# Patient Record
Sex: FEMALE | Race: WHITE | Employment: PART TIME | URBAN - METROPOLITAN AREA
[De-identification: names, ages, dates, MRNs, and addresses within clinical notes are randomized per-mention and may not be internally consistent; named-entity substitution may affect disease eponyms.]

---

## 2017-06-20 ENCOUNTER — OFFICE VISIT (OUTPATIENT)
Dept: HEMATOLOGY | Age: 57
End: 2017-06-20

## 2017-06-20 VITALS
BODY MASS INDEX: 19.45 KG/M2 | TEMPERATURE: 98.4 F | DIASTOLIC BLOOD PRESSURE: 75 MMHG | SYSTOLIC BLOOD PRESSURE: 140 MMHG | HEIGHT: 61 IN | OXYGEN SATURATION: 98 % | WEIGHT: 103 LBS | HEART RATE: 72 BPM

## 2017-06-20 DIAGNOSIS — R76.8 HCV ANTIBODY POSITIVE: Primary | ICD-10-CM

## 2017-06-20 PROBLEM — I10 HYPERTENSION: Status: ACTIVE | Noted: 2017-06-20

## 2017-06-20 PROBLEM — B18.2 CHRONIC HEPATITIS C WITHOUT HEPATIC COMA (HCC): Status: ACTIVE | Noted: 2017-06-20

## 2017-06-20 PROBLEM — Z90.49 S/P CHOLECYSTECTOMY: Status: ACTIVE | Noted: 2017-06-20

## 2017-06-20 RX ORDER — GLUCOSAM/CHONDRO/HERB 149/HYAL 750-100 MG
TABLET ORAL
COMMUNITY
End: 2017-11-13 | Stop reason: ALTCHOICE

## 2017-06-20 RX ORDER — BISMUTH SUBSALICYLATE 262 MG
1 TABLET,CHEWABLE ORAL DAILY
COMMUNITY
End: 2017-11-13 | Stop reason: ALTCHOICE

## 2017-06-20 RX ORDER — LISINOPRIL 10 MG/1
TABLET ORAL DAILY
COMMUNITY

## 2017-06-20 RX ORDER — OXYCODONE AND ACETAMINOPHEN 10; 325 MG/1; MG/1
TABLET ORAL
COMMUNITY
End: 2017-08-22

## 2017-06-20 NOTE — MR AVS SNAPSHOT
Visit Information Date & Time Provider Department Dept. Phone Encounter #  
 6/20/2017  9:30 AM Marcelo Crabtree MD [facility] [address] [place] [address] [id number] Upcoming Health Maintenance Date Due Hepatitis C Screening 1960 DTaP/Tdap/Td series (1 - Tdap) 5/1/1979 PAP AKA CERVICAL CYTOLOGY 5/1/1979 BREAST CANCER SCRN MAMMOGRAM 5/1/2008 FOBT Q 1 YEAR AGE 50-75 5/1/2008 INFLUENZA AGE 9 TO ADULT 8/1/2017 Allergies as of 6/20/2017  Review Complete On: 6/20/2017 By: Tulio Kinney No Known Allergies Current Immunizations  Never Reviewed No immunizations on file. Not reviewed this visit You Were Diagnosed With   
  
 Codes Comments HCV antibody positive    -  Primary ICD-10-CM: R76.8 ICD-9-CM: 795.79 Vitals BP Pulse Temp Height(growth percentile) 140/75 (BP 1 Location: Left arm, BP Patient Position: Sitting) 72 98.4 °F (36.9 °C) (Tympanic) 5' 1\" (1.549 m) Weight(growth percentile) SpO2 BMI Smoking Status 103 lb (46.7 kg) 98% 19.46 kg/m2 Former Smoker BMI and BSA Data Body Mass Index Body Surface Area  
 19.46 kg/m 2 1.42 m 2 Your Updated Medication List  
  
   
This list is accurate as of: 6/20/17  9:49 AM.  Always use your most recent med list.  
  
  
  
  
 IRON (DRIED) PO Take  by mouth.  
  
 lisinopril 10 mg tablet Commonly known as:  Rain Flight Take  by mouth daily. multivitamin tablet Commonly known as:  ONE A DAY Take 1 Tab by mouth daily. Omega-3-DHA-EPA-Fish Oil 1,000 mg (120 mg-180 mg) Cap Take  by mouth. PERCOCET  mg per tablet Generic drug:  oxyCODONE-acetaminophen Take  by mouth. We Performed the Following HCV RNA BY TINO QL,RFLX TO QT [89629 CPT(R)] HEPATIC FUNCTION PANEL [03576 CPT(R)] Introducing [address]!    
 [facility] introduces [employer] patient portal. Now you can access parts of your medical record, email your doctor's office, and request medication refills online. 1. In your internet browser, go to [email or web address] 2. Click on the First Time User? Click Here link in the Sign In box. You will see the New Member Sign Up page. 3. Enter your [employer] Access Code exactly as it appears below. You will not need to use this code after youve completed the sign-up process. If you do not sign up before the expiration date, you must request a new code. · [employer] Access Code: [email or web address] Expires: 9/18/2017  9:48 AM 
 
4. Enter the last four digits of your Social Security Number (xxxx) and Date of Birth (mm/dd/yyyy) as indicated and click Submit. You will be taken to the next sign-up page. 5. Create a [employer] ID. This will be your [employer] login ID and cannot be changed, so think of one that is secure and easy to remember. 6. Create a [employer] password. You can change your password at any time. 7. Enter your Password Reset Question and Answer. This can be used at a later time if you forget your password. 8. Enter your e-mail address. You will receive e-mail notification when new information is available in [address] 9. Click Sign Up. You can now view and download portions of your medical record. 10. Click the Download Summary menu link to download a portable copy of your medical information. If you have questions, please visit the Frequently Asked Questions section of the [employer] website. Remember, [employer] is NOT to be used for urgent needs. For medical emergencies, dial 911. Now available from your iPhone and Android! Please provide this summary of care documentation to your next provider. Your primary care clinician is listed as Cristine Jarvis. If you have any questions after today's visit, please call [phone].

## 2017-06-20 NOTE — PROGRESS NOTES
[address] Eleuterio Chao MD, BO Couch PA-C Alvira Mohair, NP        at [address], [address] [facility], [address]     [phone]     FAX: [phone]    at [address], [address] [place] [address]     [phone]     FAX: [phone]         Patient Care Team:  Husam Collins MD as PCP - General (Family Practice)      Problem List  Date Reviewed: 6/20/2017          Codes Class Noted    Chronic hepatitis C without hepatic coma [facility]) ICD-10-CM: B18.2  ICD-9-CM: 070.54  6/20/2017    Overview Signed 6/20/2017  9:34 AM by Jorden Ott MD     PEGINF, RBV 2006. SVR             Hypertension ICD-10-CM: I10  ICD-9-CM: 401.9  6/20/2017        S/P cholecystectomy ICD-10-CM: Z90.49  ICD-9-CM: V45.79  6/20/2017                The physicians listed above have asked me to see Jaimie Haynes in consultation regarding chronic HCV and its management. All medical records sent by the referring physicians were reviewed including imaging studies     The patient is a 59 y.o.  female who was noted to have abnormalities in liver chemistries and tested positive for chronic HCV in 2005. Risk factors for acquiring HCV are an ex- who used IV drugs. There was no history of acute incteric hepatitis at the time of these risk factors. Imaging of the liver was not recently performed. A liver biopsy was performed in 2005 at [facility]. The patient was treated with peginterferon and ribavirin in 0485-1029. The patient believes there was a sustained virologic response. The most recent laboratory studies are not available. The patient was recently retested by anti-HCV and was positive. The patient has no symptoms which can be attributed to the liver disorder.     The patient has not experienced fatigue, pain in the right side over the liver,     The patient completes all daily activities without any functional limitations. ALLERGIES  No Known Allergies    MEDICATIONS  Current Outpatient Prescriptions   Medication Sig    lisinopril (PRINIVIL, ZESTRIL) 10 mg tablet Take  by mouth daily.  oxyCODONE-acetaminophen (PERCOCET)  mg per tablet Take  by mouth.  multivitamin (ONE A DAY) tablet Take 1 Tab by mouth daily.  Omega-3-DHA-EPA-Fish Oil 1,000 mg (120 mg-180 mg) cap Take  by mouth.  FERROUS SULFATE, DRIED (IRON, DRIED, PO) Take  by mouth. No current facility-administered medications for this visit. SYSTEM REVIEW NOT RELATED TO LIVER DISEASE OR REVIEWED ABOVE:  Constitution systems: Negative for fever, chills, weight gain, weight loss. Eyes: Negative for visual changes. ENT: Negative for sore throat, painful swallowing. Respiratory: Negative for cough, hemoptysis, SOB. Cardiology: Negative for chest pain, palpitations. GI:  Negative for constipation or diarrhea. : Negative for urinary frequency, dysuria, hematuria, nocturia. Skin: Negative for rash. Hematology: Negative for easy bruising, blood clots. Musculo-skelatal: Negative for back pain, muscle pain, weakness. Neurologic: Frequent headaches. Psychology: Negative for anxiety, depression. FAMILY HISTORY:  The father  of MI. The mother  of lung cancer. There is no family history of liver disease. SOCIAL HISTORY:  The patient is . The patient has 1 child and 1 grandchildren. The patient stopped using tobacco products in . The patient consumes alcohol on rare social occasions never in excess. The patient currently works full time as a  at [employer].         PHYSICAL EXAMINATION:  Visit Vitals    /75 (BP 1 Location: Left arm, BP Patient Position: Sitting)    Pulse 72    Temp 98.4 °F (36.9 °C) (Tympanic)    Ht 5' 1\" (1.549 m)    Wt 103 lb (46.7 kg)    SpO2 98%    BMI 19.46 kg/m2     General: No acute distress. Eyes: Sclera anicteric. ENT: No oral lesions. Thyroid normal.  Nodes: No adenopathy. Skin: No spider angiomata. No jaundice. No palmar erythema. Respiratory: Lungs clear to auscultation. Cardiovascular: Regular heart rate. No murmurs. No JVD. Abdomen: Soft non-tender. Liver size normal to percussion/palpation. Spleen not palpable. No obvious ascites. Extremities: No edema. No muscle wasting. No gross arthritic changes. Neurologic: Alert and oriented. Cranial nerves grossly intact. No asterixis. LABORATORY STUDIES:  [facility] [place] of [address] [place] [address] & Units 6/20/2017   AST 0 - 40 IU/L 51 (H)   ALT 0 - 32 IU/L 64 (H)   Alk Phos 39 - 117 IU/L 77   Bili, Total 0.0 - 1.2 mg/dL 0.4   Bili, Direct 0.00 - 0.40 mg/dL 0.13   Albumin 3.5 - 5.5 g/dL 4.6     SEROLOGIES:  Serologies Latest Ref Rng & Units 6/20/2017   HCV RT-PCR, Quant IU/mL [id number]     LIVER HISTOLOGY:  Not available or performed    ENDOSCOPIC PROCEDURES:  Not available or performed    RADIOLOGY:  Not available or performed    OTHER TESTING:  Not available or performed    ASSESSMENT AND PLAN:  Chronic HCV of unclear severity. Have performed laboratory testing to monitor liver function and degree of liver injury. This included hepatic panel,     The liver transaminases are elevated. Liver function is normal.      Will perform and/or review results of HCV viral load. HCV RNA was positive. The patient was previously treated for HCV with peginterferon and ribavirin. She did not achieve a SVR and cure of HCV as she thought. She will need to return for additional laboratory testing to determine HCV genotype and Fibroscan. The need to perform an assessment of liver fibrosis was discussed with the patient. The Fibroscan can assess liver fibrosis and determine if a patient has advanced fibrosis or cirrhosis without the need for liver biopsy.   The Fibroscan is currently available at [facility] [place]. This will be performed a tthe next office visit. Discussed the treatment alternatives. The SVR/cure rate for HCV now exceeds 90% with just oral anti-viral therapy and no interferon injections or significant side effects for most patients with HCV. The specific treatment is dependent upon genotype, viral load and histology. The patient was directed to continue all current medications at the current dosages. There are no contraindications for the patient to take any medications that are necessary for treatment of other medical issues. The patient was counseled regarding alcohol consumption. The need for vaccination against viral hepatitis A and B will be assessed with serologic and instituted as appropriate. All of the above issues were discussed with the patient. All questions were answered. The patient expressed a clear understanding of the above. Follow-up [facility] [place] [facility] [place] for [address] and additioanl laboratory blood work needed to to initiate HCV treatment.     Marcelo Crabtree MD  [facility] [place] [address]  [phone]

## 2017-06-21 LAB
ALBUMIN SERPL-MCNC: 4.6 G/DL (ref 3.5–5.5)
ALP SERPL-CCNC: 77 IU/L (ref 39–117)
ALT SERPL-CCNC: 64 IU/L (ref 0–32)
AST SERPL-CCNC: 51 IU/L (ref 0–40)
BILIRUB DIRECT SERPL-MCNC: 0.13 MG/DL (ref 0–0.4)
BILIRUB SERPL-MCNC: 0.4 MG/DL (ref 0–1.2)
PROT SERPL-MCNC: 7 G/DL (ref 6–8.5)

## 2017-06-22 LAB
HCV RNA SERPL NAA+PROBE-ACNC: NORMAL IU/ML
HCV RNA SERPL NAA+PROBE-LOG IU: 6.39 LOG10 IU/ML
HCV RNA SERPL QL NAA+PROBE: POSITIVE
TEST INFORMATION: NORMAL

## 2017-07-13 ENCOUNTER — TELEPHONE (OUTPATIENT)
Dept: HEMATOLOGY | Age: 57
End: 2017-07-13

## 2017-07-18 ENCOUNTER — TELEPHONE (OUTPATIENT)
Dept: HEMATOLOGY | Age: 57
End: 2017-07-18

## 2017-07-18 NOTE — TELEPHONE ENCOUNTER
----- Message from Morelia Pedraza MD sent at 7/16/2017  3:52 PM EDT -----  Call and tell her she was not cured of HCV from the previous interferon treatment at [facility]. She still has the virus. Schedule her for follow-up with Storm Richards or April with Fibroscan.   LUL

## 2017-08-22 ENCOUNTER — OFFICE VISIT (OUTPATIENT)
Dept: HEMATOLOGY | Age: 57
End: 2017-08-22

## 2017-08-22 VITALS
WEIGHT: 97.8 LBS | OXYGEN SATURATION: 98 % | SYSTOLIC BLOOD PRESSURE: 125 MMHG | HEART RATE: 80 BPM | TEMPERATURE: 99 F | BODY MASS INDEX: 18.48 KG/M2 | DIASTOLIC BLOOD PRESSURE: 79 MMHG

## 2017-08-22 DIAGNOSIS — B18.2 CHRONIC HEPATITIS C WITHOUT HEPATIC COMA (HCC): Primary | ICD-10-CM

## 2017-08-22 RX ORDER — OXYCODONE HYDROCHLORIDE 10 MG/1
10 TABLET ORAL
COMMUNITY
Start: 2017-07-13 | End: 2017-11-13 | Stop reason: ALTCHOICE

## 2017-08-22 NOTE — MR AVS SNAPSHOT
Visit Information Date & Time Provider Department Dept. Phone Encounter #  
 8/22/2017  2:00 PM April Hattie Santacruz NP [facility] [address] [place] [address] [id number] Upcoming Health Maintenance Date Due Pneumococcal 19-64 Medium Risk (1 of 1 - PPSV23) 5/1/1977 DTaP/Tdap/Td series (1 - Tdap) 5/1/1979 FOBT Q 1 YEAR AGE 50-75 5/1/2008 BREAST CANCER SCRN MAMMOGRAM 3/15/2014 PAP AKA CERVICAL CYTOLOGY 2/16/2015 INFLUENZA AGE 9 TO ADULT 8/1/2017 Allergies as of 8/22/2017  Review Complete On: 8/22/2017 By: Jason Roca No Known Allergies Current Immunizations  Never Reviewed No immunizations on file. Not reviewed this visit You Were Diagnosed With   
  
 Codes Comments Chronic hepatitis C without hepatic coma (HCC)    -  Primary ICD-10-CM: B18.2 ICD-9-CM: 070.54 Vitals BP Pulse Temp Weight(growth percentile) SpO2 BMI  
 125/79 80 99 °F (37.2 °C) (Tympanic) 97 lb 12.8 oz (44.4 kg) 98% 18.48 kg/m2 Smoking Status Former Smoker Vitals History BMI and BSA Data Body Mass Index Body Surface Area  
 18.48 kg/m 2 1.38 m 2 Your Updated Medication List  
  
   
This list is accurate as of: 8/22/17  2:28 PM.  Always use your most recent med list.  
  
  
  
  
 IRON (DRIED) PO Take  by mouth.  
  
 lisinopril 10 mg tablet Commonly known as:  Katey Maza Take  by mouth daily. multivitamin tablet Commonly known as:  ONE A DAY Take 1 Tab by mouth daily. Omega-3-DHA-EPA-Fish Oil 1,000 mg (120 mg-180 mg) Cap Take  by mouth. oxyCODONE IR 10 mg Tab immediate release tablet Commonly known as:  Gisela Magyar Take 10 mg by mouth. We Performed the Following CBC W/O DIFF [90092 CPT(R)] HEP C GENOTYPE [44449 CPT(R)] LIVER ELASTOGRAPHY W/O IMAG W/I&R [68456 CPT(R)] METABOLIC PANEL, COMPREHENSIVE [60171 CPT(R)] Introducing [facility]! Felicitas More introduces [employer] patient portal. Now you can access parts of your medical record, email your doctor's office, and request medication refills online. 1. In your internet browser, go to [email or web address] 2. Click on the First Time User? Click Here link in the Sign In box. You will see the New Member Sign Up page. 3. Enter your [employer] Access Code exactly as it appears below. You will not need to use this code after youve completed the sign-up process. If you do not sign up before the expiration date, you must request a new code. · [employer] Access Code: [email or web address] Expires: 9/18/2017  9:48 AM 
 
4. Enter the last four digits of your Social Security Number (xxxx) and Date of Birth (mm/dd/yyyy) as indicated and click Submit. You will be taken to the next sign-up page. 5. Create a [employer]Rx ID. This will be your [employer] login ID and cannot be changed, so think of one that is secure and easy to remember. 6. Create a [employer] password. You can change your password at any time. 7. Enter your Password Reset Question and Answer. This can be used at a later time if you forget your password. 8. Enter your e-mail address. You will receive e-mail notification when new information is available in [address] 9. Click Sign Up. You can now view and download portions of your medical record. 10. Click the Download Summary menu link to download a portable copy of your medical information. If you have questions, please visit the Frequently Asked Questions section of the [employer] website. Remember, [employer] is NOT to be used for urgent needs. For medical emergencies, dial 911. Now available from your iPhone and Android! Please provide this summary of care documentation to your next provider. Your primary care clinician is listed as Henrik oLmas. If you have any questions after today's visit, please call [phone].

## 2017-08-23 LAB
ALBUMIN SERPL-MCNC: 4.5 G/DL (ref 3.5–5.5)
ALBUMIN/GLOB SERPL: 2 {RATIO} (ref 1.2–2.2)
ALP SERPL-CCNC: 67 IU/L (ref 39–117)
ALT SERPL-CCNC: 52 IU/L (ref 0–32)
AST SERPL-CCNC: 40 IU/L (ref 0–40)
BILIRUB SERPL-MCNC: 0.6 MG/DL (ref 0–1.2)
BUN SERPL-MCNC: 14 MG/DL (ref 6–24)
BUN/CREAT SERPL: 24 (ref 9–23)
CALCIUM SERPL-MCNC: 9.4 MG/DL (ref 8.7–10.2)
CHLORIDE SERPL-SCNC: 104 MMOL/L (ref 96–106)
CO2 SERPL-SCNC: 25 MMOL/L (ref 18–29)
CREAT SERPL-MCNC: 0.59 MG/DL (ref 0.57–1)
ERYTHROCYTE [DISTWIDTH] IN BLOOD BY AUTOMATED COUNT: 12.8 % (ref 12.3–15.4)
GLOBULIN SER CALC-MCNC: 2.3 G/DL (ref 1.5–4.5)
GLUCOSE SERPL-MCNC: 104 MG/DL (ref 65–99)
HCT VFR BLD AUTO: 38.4 % (ref 34–46.6)
HGB BLD-MCNC: 13.2 G/DL (ref 11.1–15.9)
MCH RBC QN AUTO: 32.3 PG (ref 26.6–33)
MCHC RBC AUTO-ENTMCNC: 34.4 G/DL (ref 31.5–35.7)
MCV RBC AUTO: 94 FL (ref 79–97)
PLATELET # BLD AUTO: 203 X10E3/UL (ref 150–379)
PLEASE NOTE:, 188601: NORMAL
POTASSIUM SERPL-SCNC: 4.1 MMOL/L (ref 3.5–5.2)
PROT SERPL-MCNC: 6.8 G/DL (ref 6–8.5)
RBC # BLD AUTO: 4.09 X10E6/UL (ref 3.77–5.28)
SODIUM SERPL-SCNC: 145 MMOL/L (ref 134–144)
WBC # BLD AUTO: 5.6 X10E3/UL (ref 3.4–10.8)

## 2017-08-23 NOTE — PROGRESS NOTES
[address] Chamorro MD, [address], [place], [place]       BO Power PA-C Vesta Loll, NP Charmaine Postin, NP        at [address], [address] [facility], [address]     [phone]     FAX: [phone]    at [address], [address] [place] [address], [address]     [phone]     FAX: [phone]     Patient Care Team:  Heaven Looney MD as PCP - [facility])  None    Patient Active Problem List   Diagnosis Code    Chronic hepatitis C without hepatic coma ([facility] [address] B18.2    Hypertension I10    S/P cholecystectomy Z90.49     Zamzam Akins returns to the [address] regarding chronic HCV and its management. The active problem list, all pertinent past medical history, medications, radiologic findings and laboratory findings related to the liver disorder were reviewed with the patient. The patient is a 59 y.o.  female who was noted to have abnormalities in liver chemistries and tested positive for chronic HCV in 2005. Risk factors for acquiring HCV are an ex- who used IV drugs. There was no history of acute incteric hepatitis at the time of these risk factors. Imaging of the liver was not recently performed. A liver biopsy was performed in 2005 at [facility]. Patient presents to the clinic today for a Fibroscan to assess liver fibrosis or scarring. The patient was treated with peginterferon and ribavirin in 8225-8481. The patient believes there was a sustained virologic response. The most recent laboratory studies are not available. The patient was recently retested by anti-HCV which was positive with a viral load of 2.46 IU/mL. A HCV genotype has not been performed. Patient returns to the clinic today to discuss current HCV treatment options.     The patient has no symptoms which can be attributed to the liver disorder. Today, patient denies abdominal pain, change in bowel habits, dark urine, myalgias, arthralgias, pruritus and problems concentrating. The patient completes all daily activities without any functional limitations. ALLERGIES  No Known Allergies    MEDICATIONS  Current Outpatient Prescriptions   Medication Sig    oxyCODONE IR (ROXICODONE) 10 mg tab immediate release tablet Take 10 mg by mouth.  lisinopril (PRINIVIL, ZESTRIL) 10 mg tablet Take  by mouth daily.  multivitamin (ONE A DAY) tablet Take 1 Tab by mouth daily.  Omega-3-DHA-EPA-Fish Oil 1,000 mg (120 mg-180 mg) cap Take  by mouth.  FERROUS SULFATE, DRIED (IRON, DRIED, PO) Take  by mouth. No current facility-administered medications for this visit. SYSTEM REVIEW NOT RELATED TO LIVER DISEASE OR REVIEWED ABOVE:  Constitution systems: Negative for fever, chills, weight gain, weight loss. Eyes: Negative for visual changes. ENT: Negative for sore throat, painful swallowing. Respiratory: Negative for cough, hemoptysis, SOB. Cardiology: Negative for chest pain, palpitations. GI:  Negative for constipation or diarrhea. : Negative for urinary frequency, dysuria, hematuria, nocturia. Skin: Negative for rash. Hematology: Negative for easy bruising, blood clots. Musculo-skelatal: Negative for back pain, muscle pain, weakness. Neurologic: Frequent headaches. Psychology: Negative for anxiety, depression. FAMILY HISTORY:  The father  of MI. The mother  of lung cancer. There is no family history of liver disease. SOCIAL HISTORY:  The patient is . The patient has 1 child and 1 grandchildren. The patient stopped using tobacco products in . The patient consumes alcohol on rare social occasions never in excess. The patient currently works full time as a  at [employer].       PHYSICAL EXAMINATION:  Visit Vitals    /79    Pulse 80    Temp 99 °F (37.2 °C) (Tympanic)    Wt 97 lb 12.8 oz (44.4 kg)    SpO2 98%    BMI 18.48 kg/m2     General: No acute distress. Eyes: Sclera anicteric. ENT: No oral lesions. Thyroid normal.  Nodes: No adenopathy. Skin: No spider angiomata. No jaundice. No palmar erythema. Respiratory: Lungs clear to auscultation. Cardiovascular: Regular heart rate. No murmurs. No JVD. Abdomen: Soft non-tender. Liver size normal to percussion/palpation. Spleen not palpable. No obvious ascites. Extremities: No edema. No muscle wasting. No gross arthritic changes. Neurologic: Alert and oriented. Cranial nerves grossly intact. No asterixis. LABORATORY STUDIES:  Liver [place] of [address] [place] [address] & Units 8/22/2017 6/20/2017   WBC 3.4 - 10.8 x10E3/uL 5.6    HGB 11.1 - 15.9 g/dL 13.2     - 379 x10E3/uL 203    AST 0 - 40 IU/L 40 51 (H)   ALT 0 - 32 IU/L 52 (H) 64 (H)   Alk Phos 39 - 117 IU/L 67 77   Bili, Total 0.0 - 1.2 mg/dL 0.6 0.4   Bili, Direct 0.00 - 0.40 mg/dL  0.13   Albumin 3.5 - 5.5 g/dL 4.5 4.6   BUN 6 - 24 mg/dL 14    Creat 0.57 - 1.00 mg/dL 0.59    Na 134 - 144 mmol/L 145 (H)    K 3.5 - 5.2 mmol/L 4.1    Cl 96 - 106 mmol/L 104    CO2 18 - 29 mmol/L 25    Glucose 65 - 99 mg/dL 104 (H)      SEROLOGIES:  Serologies Latest Ref Rng & Units 6/20/2017   HCV RT-PCR, Quant IU/mL [id number]     LIVER HISTOLOGY:  8/22/2017. FibroScan performed at [address]. EkPa was 6.2. Suggested fibrosis level is F1.    ENDOSCOPIC PROCEDURES:  Not available or performed    RADIOLOGY:  Not available or performed    OTHER TESTING:  Not available or performed    ASSESSMENT AND PLAN:  Chronic HCV portal fibrosis. This was confirmed with FibroScan today. Results were discussed in detail with patient. The liver transaminases are mildly elevated. Liver function is normal.      HCV treatment. The patient was previously treated for HCV with peginterferon and ribavirin. She did not achieve a SVR and cure of HCV as she thought.  HCV genotype was drawn today and is pending. She most likely has genotype 1. Discussed treatment alternatives. Recommended treatment with sofosbuvir/ledipasvir for 12 weeks. Side effects, cure rate, visit schedule and duration of therapy was discussed thoroughly with patient today (25 minutes total, more than half the office visit). Alec Pay will be ordered once the HCV genotype has resulted. The patient was directed to continue all current medications at the current dosages. There are no contraindications for the patient to take any medications that are necessary for treatment of other medical issues. The patient was counseled regarding alcohol consumption. All of the above issues were discussed with the patient. All questions were answered. The patient expressed a clear understanding of the above. [address] [place] [address] in 4 weeks after HCV treatment has been initiated[place]  [address] [address]  Ph: [phone]  Fax: [phone]

## 2017-08-25 LAB
HCV GENTYP SERPL NAA+PROBE: NORMAL
PLEASE NOTE, 550474: NORMAL

## 2017-08-25 RX ORDER — LEDIPASVIR AND SOFOSBUVIR 90; 400 MG/1; MG/1
1 TABLET, FILM COATED ORAL DAILY
Qty: 28 TAB | Refills: 2 | Status: SHIPPED | OUTPATIENT
Start: 2017-08-25 | End: 2017-10-02 | Stop reason: CLARIF

## 2017-11-13 ENCOUNTER — OFFICE VISIT (OUTPATIENT)
Dept: HEMATOLOGY | Age: 57
End: 2017-11-13

## 2017-11-13 VITALS
SYSTOLIC BLOOD PRESSURE: 122 MMHG | DIASTOLIC BLOOD PRESSURE: 73 MMHG | WEIGHT: 100 LBS | BODY MASS INDEX: 18.88 KG/M2 | HEIGHT: 61 IN | TEMPERATURE: 97 F | OXYGEN SATURATION: 100 % | HEART RATE: 81 BPM

## 2017-11-13 DIAGNOSIS — B18.2 CHRONIC HEPATITIS C WITHOUT HEPATIC COMA (HCC): Primary | ICD-10-CM

## 2017-11-13 DIAGNOSIS — R19.01 RIGHT UPPER QUADRANT ABDOMINAL MASS: ICD-10-CM

## 2017-11-13 NOTE — PROGRESS NOTES
[address] An[address] Chamorro MD, Erika Fernandez, [facility] Antelmo Mazariegos, [place]       Shelbi Law, TYRELL Hernandez, [facility]   BO Masters NP        at [address], [address] [address]     [phone]     FAX: [phone]    at [facility], [address] [place] [address], [address]     [phone]     FAX: [phone]     Patient Care Team:  Margie Grant MD as PCP - General (Family Practice)  None    Patient Active Problem List   Diagnosis Code    Chronic hepatitis C without hepatic coma ([facility] [address] [facility] B18.2    Hypertension I10    S/P cholecystectomy Z90.49     Lillian Cheek returns to the [facility] regarding chronic HCV and its management. The active problem list, all pertinent past medical history, medications, radiologic findings and laboratory findings related to the liver disorder were reviewed with the patient. The patient is a 59 y.o.  female who was noted to have abnormalities in liver chemistries and tested positive for chronic HCV in 2005. Risk factors for acquiring HCV are an ex- who used IV drugs. There was no history of acute incteric hepatitis at the time of these risk factors. Imaging of the liver was not recently performed. A liver biopsy was performed in 2005 at [facility]. Fibroscan in August 2017 demonstrated portal fibrosis. Patient was treated with peginterferon and ribavirin in 3079-6000. She was a non-responder to this treatment. Patient is now undergoing HCV treatment with Tg Apley XR/RBV x 4 weeks (started-10/13/2017). This was the only regimen approved by her insurance. She has done well on treatment with no significant side effects. She initially had a rash on her torso but resolved with the use of OTC cream. She has missed no medications since starting treatment.  She returns to the clinic today to monitor her response to treatment. The patient has no symptoms which can be attributed to the liver disorder. She complains of a mass on the right side of her torso. This is not painful and there is no discharge. Today, patient denies abdominal pain, change in bowel habits, dark urine, myalgias, arthralgias, pruritus and problems concentrating. The patient completes all daily activities without any functional limitations. ALLERGIES  No Known Allergies    MEDICATIONS  Current Outpatient Prescriptions   Medication Sig    rqhwwo-ebluszg-hkzsl-dasabuvir (VIEKIRA XR) 8.33 mg-50 mg- 33.33 mg-200 mg TBph Take 3 Tabs by mouth daily.  ribavirin (MODERIBA DOSE PACK) 600 mg (7)- 400 mg (7) DsPk Take 1 Tab by mouth two (2) times a day. Use as directed    lisinopril (PRINIVIL, ZESTRIL) 10 mg tablet Take  by mouth daily. No current facility-administered medications for this visit. SYSTEM REVIEW NOT RELATED TO LIVER DISEASE OR REVIEWED ABOVE:  Constitution systems: Negative for fever, chills, weight gain, weight loss. Eyes: Negative for visual changes. ENT: Negative for sore throat, painful swallowing. Respiratory: Negative for cough, hemoptysis, SOB. Cardiology: Negative for chest pain, palpitations. GI:  Negative for constipation or diarrhea. : Negative for urinary frequency, dysuria, hematuria, nocturia. Skin: Negative for rash. Hematology: Negative for easy bruising, blood clots. Musculo-skelatal: Negative for back pain, muscle pain, weakness. Neurologic: Frequent headaches. Psychology: Negative for anxiety, depression. FAMILY HISTORY:  The father  of MI. The mother  of lung cancer. There is no family history of liver disease. SOCIAL HISTORY:  The patient is . The patient has 1 child and 1 grandchildren. The patient stopped using tobacco products in . The patient consumes alcohol on rare social occasions never in excess.     The patient currently works full time as a  at [employer]. PHYSICAL EXAMINATION:  Visit Vitals    /73 (BP 1 Location: Left arm, BP Patient Position: Sitting)    Pulse 81    Temp 97 °F (36.1 °C) (Tympanic)    Ht 5' 1\" (1.549 m)    Wt 100 lb (45.4 kg)    SpO2 100%    BMI 18.89 kg/m2     General: No acute distress. Eyes: Sclera anicteric. ENT: No oral lesions. Thyroid normal.  Nodes: No adenopathy. Skin: No spider angiomata. No jaundice. No palmar erythema. Respiratory: Lungs clear to auscultation. Cardiovascular: Regular heart rate. No murmurs. No JVD. Abdomen: Soft non-tender mass on the right side (mid-section). Liver size normal to percussion/palpation. Spleen not palpable. No obvious ascites. Extremities: No edema. No muscle wasting. No gross arthritic changes. Neurologic: Alert and oriented. Cranial nerves grossly intact. No asterixis. LABORATORY STUDIES:  [facility] Ref Rng & Units 11/13/2017 8/22/2017   WBC 3.4 - 10.8 x10E3/uL 6.0 5.6   HGB 11.1 - 15.9 g/dL 10.8 (L) 13.2    - 379 x10E3/uL 260 203   AST 0 - 40 IU/L 15 40   ALT 0 - 32 IU/L 11 52 (H)   Alk Phos 39 - 117 IU/L 85 67   Bili, Total 0.0 - 1.2 mg/dL 1.2 0.6   Bili, Direct 0.00 - 0.40 mg/dL 0.26    Albumin 3.5 - 5.5 g/dL 4.9 4.5   BUN 6 - 24 mg/dL  14   Creat 0.57 - 1.00 mg/dL  0.59   Na 134 - 144 mmol/L  145 (H)   K 3.5 - 5.2 mmol/L  4.1   Cl 96 - 106 mmol/L  104   CO2 18 - 29 mmol/L  25   Glucose 65 - 99 mg/dL  104 (H)     SEROLOGIES:  Serologies Latest Ref Rng & Units 11/13/2017 8/22/2017   Hep B Surface Ag Negative Negative    Hep B Core Ab, Total Negative Negative    Hep B Surface AB QL  Non Reactive    Hep C Genotype   1a   HCV RT-PCR, Quant IU/mL       Serologies Latest Ref Rng & Units 6/20/2017   Hep B Surface Ag Negative    Hep B Core Ab, Total Negative    Hep B Surface AB QL     Hep C Genotype     HCV RT-PCR, Quant IU/mL [id number]     LIVER HISTOLOGY:  8/22/2017.  FibroScan performed at [facility]. EkPa was 6.2. Suggested fibrosis level is F1.    ENDOSCOPIC PROCEDURES:  Not available or performed    RADIOLOGY:  Not available or performed    OTHER TESTING:  Not available or performed    ASSESSMENT AND PLAN:  Chronic HCV portal fibrosis. The liver transaminases are normal. Liver function is normal.      HCV treatment. Currently on treatment with Viekira XR/RBV x 4 weeks. She is tolerating treatment well. Will review HCV RNA when available to monitor response to treatment. She will complete 12 weeks of therapy. Mass on the right side of her torso. This is new since starting treatment. It is soft, not painful and it's mobile. Most likely a lipoma or cyst. Ordered a complete abdominal US to rule out abnormality. Will review results when available. The patient was directed to continue all current medications at the current dosages. There are no contraindications for the patient to take any medications that are necessary for treatment of other medical issues. The patient was counseled regarding alcohol consumption. All of the above issues were discussed with the patient. All questions were answered. The patient expressed a clear understanding of the above. [address] [address] in 2 months.     Edgard Ervin NP  [address] [address]  Ph: [phone]  Fax: [phone]

## 2017-11-14 LAB
ALBUMIN SERPL-MCNC: 4.9 G/DL (ref 3.5–5.5)
ALP SERPL-CCNC: 85 IU/L (ref 39–117)
ALT SERPL-CCNC: 11 IU/L (ref 0–32)
AST SERPL-CCNC: 15 IU/L (ref 0–40)
BILIRUB DIRECT SERPL-MCNC: 0.26 MG/DL (ref 0–0.4)
BILIRUB SERPL-MCNC: 1.2 MG/DL (ref 0–1.2)
ERYTHROCYTE [DISTWIDTH] IN BLOOD BY AUTOMATED COUNT: 14 % (ref 12.3–15.4)
HBV CORE AB SERPL QL IA: NEGATIVE
HBV SURFACE AB SER QL: NON REACTIVE
HBV SURFACE AG SERPL QL IA: NEGATIVE
HCT VFR BLD AUTO: 33.2 % (ref 34–46.6)
HGB BLD-MCNC: 10.8 G/DL (ref 11.1–15.9)
MCH RBC QN AUTO: 32 PG (ref 26.6–33)
MCHC RBC AUTO-ENTMCNC: 32.5 G/DL (ref 31.5–35.7)
MCV RBC AUTO: 98 FL (ref 79–97)
PLATELET # BLD AUTO: 260 X10E3/UL (ref 150–379)
PROT SERPL-MCNC: 7.3 G/DL (ref 6–8.5)
RBC # BLD AUTO: 3.38 X10E6/UL (ref 3.77–5.28)
WBC # BLD AUTO: 6 X10E3/UL (ref 3.4–10.8)

## 2017-11-16 LAB — HCV RNA SERPL QL NAA+PROBE: NEGATIVE

## 2017-11-20 ENCOUNTER — HOSPITAL ENCOUNTER (OUTPATIENT)
Dept: ULTRASOUND IMAGING | Age: 57
Discharge: HOME | End: 2017-11-20
Attending: NURSE PRACTITIONER
Payer: COMMERCIAL

## 2017-11-20 DIAGNOSIS — B18.2 CHRONIC HEPATITIS C WITHOUT HEPATIC COMA (HCC): ICD-10-CM

## 2017-11-20 DIAGNOSIS — R19.01 RIGHT UPPER QUADRANT ABDOMINAL MASS: ICD-10-CM

## 2017-11-20 PROCEDURE — 76700 US EXAM ABDOM COMPLETE: CPT

## 2018-02-05 ENCOUNTER — OFFICE VISIT (OUTPATIENT)
Dept: HEMATOLOGY | Age: 58
End: 2018-02-05

## 2018-02-05 VITALS
SYSTOLIC BLOOD PRESSURE: 127 MMHG | BODY MASS INDEX: 19.88 KG/M2 | OXYGEN SATURATION: 98 % | TEMPERATURE: 97.1 F | HEART RATE: 72 BPM | DIASTOLIC BLOOD PRESSURE: 86 MMHG | WEIGHT: 105.2 LBS

## 2018-02-05 DIAGNOSIS — B18.2 CHRONIC HEPATITIS C WITHOUT HEPATIC COMA (HCC): Primary | ICD-10-CM

## 2018-02-05 NOTE — PROGRESS NOTES
1. Have you been to the ER, urgent care clinic since your last visit? Hospitalized since your last visit? No    2. Have you seen or consulted any other health care providers outside of the [address] [place] [address] since your last visit? Include any pap smears or colon screening.  No   Chief Complaint   Patient presents with    Follow-up     Visit Vitals    /86 (BP 1 Location: Left arm, BP Patient Position: Sitting)    Pulse 72    Temp 97.1 °F (36.2 °C) (Tympanic)    Wt 105 lb 3.2 oz (47.7 kg)    SpO2 98%    BMI 19.88 kg/m2     PHQ over the last two weeks 2/5/2018   Little interest or pleasure in doing things Not at all   Feeling down, depressed or hopeless Not at all   Total Score PHQ 2 0     Learning Assessment 2/5/2018   PRIMARY LEARNER Patient   PRIMARY LANGUAGE ENGLISH   LEARNER PREFERENCE PRIMARY LISTENING   ANSWERED BY patient    RELATIONSHIP SELF

## 2018-02-05 NOTE — MR AVS SNAPSHOT
[facility] [facility] [phone] [address] [place] [address] 
[phone] Patient: Leo Domíngeuz MRN: [id number] UJE:9/[birth year]04 Visit Information Date & Time Provider Department Dept. Phone Encounter #  
 2/5/2018 10:45 AM April Maame 18, [facility] [id number] Follow-up Instructions Return in about 6 months (around 8/5/2018). Upcoming Health Maintenance Date Due Pneumococcal 19-64 Medium Risk (1 of 1 - PPSV23) 5/1/1977 DTaP/Tdap/Td series (1 - Tdap) 5/1/1979 FOBT Q 1 YEAR AGE 50-75 5/1/2008 BREAST CANCER SCRN MAMMOGRAM 3/15/2014 PAP AKA CERVICAL CYTOLOGY 2/16/2015 Influenza Age 5 to Adult 8/1/2017 Allergies as of 2/5/2018  Review Complete On: 2/5/2018 By: Luz Elena Isabel 18, NP No Known Allergies Current Immunizations  Never Reviewed No immunizations on file. Not reviewed this visit You Were Diagnosed With   
  
 Codes Comments Chronic hepatitis C without hepatic coma (HCC)    -  Primary ICD-10-CM: B18.2 ICD-9-CM: 070.54 Vitals BP Pulse Temp Weight(growth percentile) SpO2 BMI  
 127/86 (BP 1 Location: Left arm, BP Patient Position: Sitting) 72 97.1 °F (36.2 °C) (Tympanic) 105 lb 3.2 oz (47.7 kg) 98% 19.88 kg/m2 Smoking Status Former Smoker BMI and BSA Data Body Mass Index Body Surface Area  
 19.88 kg/m 2 1.43 m 2 Preferred Pharmacy Pharmacy Name Phone [address] [place] [address] Maria Del Rosario Metz Said [phone] Your Updated Medication List  
  
   
This list is accurate as of: 2/5/18 11:06 AM.  Always use your most recent med list.  
  
  
  
  
 lisinopril 10 mg tablet Commonly known as:  Luiz Flaherty Take  by mouth daily. We Performed the Following HCV RNA BY TINO QL,RFLX TO QT [64743 CPT(R)] HEPATIC FUNCTION PANEL [69004 CPT(R)] Follow-up Instructions Return in about 6 months (around 8/5/2018). Please provide this summary of care documentation to your next provider. Your primary care clinician is listed as Lisa Bell. If you have any questions after today's visit, please call [phone].

## 2018-02-06 LAB
ALBUMIN SERPL-MCNC: 4.7 G/DL (ref 3.5–5.5)
ALP SERPL-CCNC: 81 IU/L (ref 39–117)
ALT SERPL-CCNC: 16 IU/L (ref 0–32)
AST SERPL-CCNC: 20 IU/L (ref 0–40)
BILIRUB DIRECT SERPL-MCNC: 0.09 MG/DL (ref 0–0.4)
BILIRUB SERPL-MCNC: 0.3 MG/DL (ref 0–1.2)
PROT SERPL-MCNC: 6.9 G/DL (ref 6–8.5)

## 2018-02-06 NOTE — PROGRESS NOTES
134 E Rebound MD Pedro Pablo, [address], [place], [place]       BO Leblanc PA-C Aloysius Frisk, [facility]   BO Rabago NP        at [address], [address] [address]     [phone]     FAX: [phone]    at [address], [address]     [phone]     FAX: [phone]     Patient Care Team:  Esdras Kang MD as PCP - General (Family Practice)  None    Patient Active Problem List   Diagnosis Code    [facility] B18.2    Hypertension I10    S/P cholecystectomy Z90.49     Jennifer Martino returns to the [address] regarding chronic HCV and its management. The active problem list, all pertinent past medical history, medications, radiologic findings and laboratory findings related to the liver disorder were reviewed with the patient. The patient is a 59 y.o.  female who was noted to have abnormalities in liver chemistries and tested positive for chronic HCV in 2005. Risk factors for acquiring HCV are an ex- who used IV drugs. There was no history of acute incteric hepatitis at the time of these risk factors. A liver biopsy was performed in 2005 at [facility]. Fibroscan in August 2017 demonstrated portal fibrosis. Patient was treated with peginterferon and ribavirin in 8934-4854. She was a non-responder to this treatment. Patient completed 12 weeks of HCV treatment with Viekira XR/RBV (10/2017-1/2018). This was the only regimen approved by her insurance. She did well on treatment with no significant side effects. She missed no medications throughout treatment. She returns to the clinic today to monitor her response to treatment. The patient has no symptoms which can be attributed to the liver disorder.  Today, patient denies abdominal pain, change in bowel habits, dark urine, myalgias, arthralgias, pruritus and problems concentrating. The patient completes all daily activities without any functional limitations. ALLERGIES  No Known Allergies    MEDICATIONS  Current Outpatient Prescriptions   Medication Sig    lisinopril (PRINIVIL, ZESTRIL) 10 mg tablet Take  by mouth daily. No current facility-administered medications for this visit. SYSTEM REVIEW NOT RELATED TO LIVER DISEASE OR REVIEWED ABOVE:  Constitution systems: Negative for fever, chills, weight gain, weight loss. Eyes: Negative for visual changes. ENT: Negative for sore throat, painful swallowing. Respiratory: Negative for cough, hemoptysis, SOB. Cardiology: Negative for chest pain, palpitations. GI:  Negative for constipation or diarrhea. : Negative for urinary frequency, dysuria, hematuria, nocturia. Skin: Negative for rash. Hematology: Negative for easy bruising, blood clots. Musculo-skelatal: Negative for back pain, muscle pain, weakness. Neurologic: Frequent headaches. Psychology: Negative for anxiety, depression. FAMILY HISTORY:  The father  of MI. The mother  of lung cancer. There is no family history of liver disease. SOCIAL HISTORY:  The patient is . The patient has 1 child and 1 grandchildren. The patient stopped using tobacco products in . The patient consumes alcohol on rare social occasions never in excess. The patient currently works full time as a  at [employer]. PHYSICAL EXAMINATION:  Visit Vitals    /86 (BP 1 Location: Left arm, BP Patient Position: Sitting)    Pulse 72    Temp 97.1 °F (36.2 °C) (Tympanic)    Wt 105 lb 3.2 oz (47.7 kg)    SpO2 98%    BMI 19.88 kg/m2     General: No acute distress. Eyes: Sclera anicteric. ENT: No oral lesions. Thyroid normal.  Nodes: No adenopathy. Skin: No spider angiomata. No jaundice. No palmar erythema.   Respiratory: Lungs clear to auscultation. Cardiovascular: Regular heart rate. No murmurs. No JVD. Abdomen: Soft non-tender mass on the right side (mid-section). Liver size normal to percussion/palpation. Spleen not palpable. No obvious ascites. Extremities: No edema. No muscle wasting. No gross arthritic changes. Neurologic: Alert and oriented. Cranial nerves grossly intact. No asterixis. LABORATORY STUDIES:  Liver [place] of [address] Units 2/5/2018 11/13/2017   WBC 3.4 - 10.8 x10E3/uL  6.0   HGB 11.1 - 15.9 g/dL  10.8 (L)    - 379 x10E3/uL  260   AST 0 - 40 IU/L 20 15   ALT 0 - 32 IU/L 16 11   Alk Phos 39 - 117 IU/L 81 85   Bili, Total 0.0 - 1.2 mg/dL 0.3 1.2   Bili, Direct 0.00 - 0.40 mg/dL 0.09 0.26   Albumin 3.5 - 5.5 g/dL 4.7 4.9   BUN 6 - 24 mg/dL     Creat 0.57 - 1.00 mg/dL     Na 134 - 144 mmol/L     K 3.5 - 5.2 mmol/L     Cl 96 - 106 mmol/L     CO2 18 - 29 mmol/L     Glucose 65 - 99 mg/dL     Virology Latest Ref Rng & Units  11/13/2017   HCV RNA, TINO, QL Negative  Negative     A hepatic function panel and HCV RNA were ordered today. Will review results when available. SEROLOGIES:  Serologies Latest Ref Rng & Units 11/13/2017 8/22/2017   Hep B Surface Ag Negative Negative    Hep B Core Ab, Total Negative Negative    Hep B Surface AB QL  Non Reactive    Hep C Genotype   1a   HCV RT-PCR, Quant IU/mL       Serologies Latest Ref Rng & Units 6/20/2017   Hep B Surface Ag Negative    Hep B Core Ab, Total Negative    Hep B Surface AB QL     Hep C Genotype     HCV RT-PCR, Quant IU/mL [id number]     LIVER HISTOLOGY:  8/22/2017. FibroScan performed at [facility]. EkPa was 6.2. Suggested fibrosis level is F1.    ENDOSCOPIC PROCEDURES:  Not available or performed    RADIOLOGY:  11/2017. Abdominal US. Liver is within normal limits. Patient is status post cholecystectomy.  The palpable abnormality right flank as directed by the patient has the appearance of fat and may represent a lipoma    OTHER TESTING:  Not available or performed    ASSESSMENT AND PLAN:  Chronic HCV portal fibrosis. The liver transaminases are normal. Liver function is normal.      HCV treatment. Completed 12 weeks of Viekira XR/RBV treatment in January 2018. Tolerated treatment very well. Will review HCV RNA when available to monitor response to treatment. The patient was directed to continue all current medications at the current dosages. There are no contraindications for the patient to take any medications that are necessary for treatment of other medical issues. The patient was counseled regarding alcohol consumption. All of the above issues were discussed with the patient. All questions were answered. The patient expressed a clear understanding of the above. [address] [address] in 6 months.     Michelle Baltazar NP  [address] [address], [address] [place] [address]  Ph: [phone]  Fax: [phone]

## 2018-02-07 LAB — HCV RNA SERPL QL NAA+PROBE: NEGATIVE

## 2018-08-06 ENCOUNTER — OFFICE VISIT (OUTPATIENT)
Dept: HEMATOLOGY | Age: 58
End: 2018-08-06

## 2018-08-06 VITALS
BODY MASS INDEX: 19.12 KG/M2 | WEIGHT: 101.2 LBS | HEART RATE: 64 BPM | DIASTOLIC BLOOD PRESSURE: 75 MMHG | TEMPERATURE: 97.7 F | OXYGEN SATURATION: 98 % | SYSTOLIC BLOOD PRESSURE: 135 MMHG

## 2018-08-06 DIAGNOSIS — B18.2 CHRONIC HEPATITIS C WITHOUT HEPATIC COMA (HCC): Primary | ICD-10-CM

## 2018-08-06 NOTE — MR AVS SNAPSHOT
[facility] [phone] [address] [place] [address] 
[phone] Patient: Valdez Castle MRN: [id number] IHB:3/5/4402 Visit Information Date & Time Provider Department Dept. Phone Encounter #  
 8/6/2018  9:30 AM April JORGE Ricks, [facility] 219 [id number] Upcoming Health Maintenance Date Due Pneumococcal 19-64 Medium Risk (1 of 1 - PPSV23) 5/1/1977 DTaP/Tdap/Td series (1 - Tdap) 5/1/1979 FOBT Q 1 YEAR AGE 50-75 5/1/2008 BREAST CANCER SCRN MAMMOGRAM 3/15/2014 PAP AKA CERVICAL CYTOLOGY 2/16/2015 ZOSTER VACCINE AGE 60> 3/1/2018 Influenza Age 5 to Adult 8/1/2018 Allergies as of 8/6/2018  Review Complete On: 8/6/2018 By: Luz Elena Jacobs NP No Known Allergies Current Immunizations  Never Reviewed No immunizations on file. Not reviewed this visit You Were Diagnosed With   
  
 Codes Comments Chronic hepatitis C without hepatic coma (HCC)    -  Primary ICD-10-CM: B18.2 ICD-9-CM: 070.54 Vitals BP Pulse Temp Weight(growth percentile) SpO2 BMI  
 135/75 (BP 1 Location: Left arm, BP Patient Position: Sitting) 64 97.7 °F (36.5 °C) (Tympanic) 101 lb 3.2 oz (45.9 kg) 98% 19.12 kg/m2 Smoking Status Former Smoker Vitals History BMI and BSA Data Body Mass Index Body Surface Area  
 19.12 kg/m 2 1.41 m 2 Preferred Pharmacy Pharmacy Name Phone [address] [place] [address], [address] [place] [address] Maria Del Rosario Metz Said [phone] Your Updated Medication List  
  
   
This list is accurate as of 8/6/18  9:51 AM.  Always use your most recent med list.  
  
  
  
  
 lisinopril 10 mg tablet Commonly known as:  Esther [place] Take  by mouth daily. We Performed the Following CBC W/O DIFF [09597 CPT(R)] HCV RNA BY TINO QL,RFLX TO QT [22169 CPT(R)] METABOLIC PANEL, COMPREHENSIVE [63156 CPT(R)] Please provide this summary of care documentation to your next provider. Your primary care clinician is listed as Tunde Eddy. If you have any questions after today's visit, please call [phone].

## 2018-08-06 NOTE — PROGRESS NOTES
[address] MD Pedro Pablo, [address], [place], [place]       Burnadette Fila, NP Elsa Dubin, PA-C Damaris Quan, [facility]   BO Stubbs NP        at [address]     [address] [place] [address], [address] [facility], [address]     [phone]     FAX: [phone]    at [facility], [address] [place] [address], [address]     [phone]     FAX: [phone]     Patient Care Team:  Mary Inman MD as PCP - General (Family Practice)  None    Patient Active Problem List   Diagnosis Code    Chronic hepatitis C without hepatic coma ([facility] B18.2    Hypertension I10    S/P cholecystectomy Z90.49       Jennifer Goes returns to the [address] regarding chronic HCV and its management. The active problem list, all pertinent past medical history, medications, radiologic findings and laboratory findings related to the liver disorder were reviewed with the patient. The patient is a 59 y.o.  female who was noted to have abnormalities in liver chemistries and tested positive for chronic HCV in 2005. Risk factors for acquiring HCV are an ex- who used IV drugs. There was no history of acute incteric hepatitis at the time of these risk factors. A liver biopsy was performed in 2005 at [facility]. Fibroscan in August 2017 demonstrated portal fibrosis. Patient completed 12 weeks of HCV treatment with Viekira XR/RBV (10/2017-1/2018). This was the only regimen approved by her insurance. She returns to the clinic today to monitor her response to treatment. The patient has no symptoms which can be attributed to the liver disorder. Today, patient denies abdominal pain, change in bowel habits, dark urine, myalgias, arthralgias, pruritus and problems concentrating. The patient completes all daily activities without any functional limitations.     ALLERGIES  No Known Allergies    MEDICATIONS  Current Outpatient Prescriptions   Medication Sig    lisinopril (PRINIVIL, ZESTRIL) 10 mg tablet Take  by mouth daily. No current facility-administered medications for this visit. SYSTEM REVIEW NOT RELATED TO LIVER DISEASE OR REVIEWED ABOVE:  Constitution systems: Negative for fever, chills, weight gain, weight loss. Eyes: Negative for visual changes. ENT: Negative for sore throat, painful swallowing. Respiratory: Negative for cough, hemoptysis, SOB. Cardiology: Negative for chest pain, palpitations. GI:  Negative for constipation or diarrhea. : Negative for urinary frequency, dysuria, hematuria, nocturia. Skin: Negative for rash. Hematology: Negative for easy bruising, blood clots. Musculo-skelatal: Negative for back pain, muscle pain, weakness. Neurologic: Frequent headaches. Psychology: Negative for anxiety, depression. FAMILY HISTORY:  The father  of MI. The mother  of lung cancer. There is no family history of liver disease. SOCIAL HISTORY:  The patient is . The patient has 1 child and 1 grandchildren. The patient stopped using tobacco products in . The patient consumes alcohol on rare social occasions never in excess. The patient currently works full time as a  at [employer]. PHYSICAL EXAMINATION:  Visit Vitals    /75 (BP 1 Location: Left arm, BP Patient Position: Sitting)    Pulse 64    Temp 97.7 °F (36.5 °C) (Tympanic)    Wt 101 lb 3.2 oz (45.9 kg)    SpO2 98%    BMI 19.12 kg/m2     General: No acute distress. Eyes: Sclera anicteric. ENT: No oral lesions. Thyroid normal.  Nodes: No adenopathy. Skin: No spider angiomata. No jaundice. No palmar erythema. Respiratory: Lungs clear to auscultation. Cardiovascular: Regular heart rate. No murmurs. No JVD. Abdomen: Soft non-tender mass on the right side (mid-section). Liver size normal to percussion/palpation.  Spleen not palpable. No obvious ascites. Extremities: No edema. No muscle wasting. No gross arthritic changes. Neurologic: Alert and oriented. Cranial nerves grossly intact. No asterixis. LABORATORY STUDIES:  [facility] [place] [facility] [address] [place] [address] Units 8/6/2018 2/5/2018   WBC 3.4 - 10.8 x10E3/uL 6.1    HGB 11.1 - 15.9 g/dL 12.6     - 379 x10E3/uL 201    AST 0 - 40 IU/L 14 20   ALT 0 - 32 IU/L 12 16   Alk Phos 39 - 117 IU/L 67 81   Bili, Total 0.0 - 1.2 mg/dL 0.6 0.3   Bili, Direct 0.00 - 0.40 mg/dL  0.09   Albumin 3.6 - 4.8 g/dL 4.5 4.7   BUN 8 - 27 mg/dL 22    Creat 0.57 - 1.00 mg/dL 0.57    Na 134 - 144 mmol/L 141    K 3.5 - 5.2 mmol/L 4.6    Cl 96 - 106 mmol/L 104    CO2 20 - 29 mmol/L 20    Glucose 65 - 99 mg/dL 99    Virology Latest Ref Rng & Units  2/5/2018   HCV RNA, TINO, QL Negative  Negative     A hepatic function panel and HCV RNA were ordered today. Will review results when available. SEROLOGIES:  Serologies Latest Ref Rng & Units 11/13/2017 8/22/2017   Hep B Surface Ag Negative Negative    Hep B Core Ab, Total Negative Negative    Hep B Surface AB QL  Non Reactive    Hep C Genotype   1a   HCV RT-PCR, Quant IU/mL       Serologies Latest Ref Rng & Units 6/20/2017   Hep B Surface Ag Negative    Hep B Core Ab, Total Negative    Hep B Surface AB QL     Hep C Genotype     HCV RT-PCR, Quant IU/mL [id number]     LIVER HISTOLOGY:  8/22/2017. FibroScan performed at [address]. EkPa was 6.2. Suggested fibrosis level is F1.    ENDOSCOPIC PROCEDURES:  Not available or performed    RADIOLOGY:  11/2017. Abdominal US. Liver is within normal limits. Patient is status post cholecystectomy. The palpable abnormality right flank as directed by the patient has the appearance of fat and may represent a lipoma    OTHER TESTING:  Not available or performed    ASSESSMENT AND PLAN:  Chronic HCV portal fibrosis. The liver transaminases are normal. Liver function is normal.      HCV treatment.  Completed 12 weeks of Ioana Rinks XR/RBV treatment in January 2018. She is a sustained virologic responder to this treatment, or cured. Will review HCV RNA one last time to confirm that she has remained virus negative. Discussed with patient in detail (30 minutes, more than half the office visit) that she will never be able to donate blood, she can be an organ donor if she chooses, she does not have active immunity and to keep alcohol intake to a minimum. She will no longer need to follow up in our clinic. Encouraged regular follow up with her PCP. Patient verbalized understanding of this. The patient was directed to continue all current medications at the current dosages. There are no contraindications for the patient to take any medications that are necessary for treatment of other medical issues. The patient was counseled regarding alcohol consumption. All of the above issues were discussed with the patient. All questions were answered. The patient expressed a clear understanding of the above.     Follow-up [facility] [place] of [address] [place] [address]  [facility] [place] [address], [address] [place] [address] [place] [place]  Ph: [phone]  Fax: [phone]

## 2018-08-06 NOTE — PROGRESS NOTES
Chief Complaint   Patient presents with    Follow-up     Visit Vitals    /75 (BP 1 Location: Left arm, BP Patient Position: Sitting)    Pulse 64    Temp 97.7 °F (36.5 °C) (Tympanic)    Wt 101 lb 3.2 oz (45.9 kg)    SpO2 98%    BMI 19.12 kg/m2     PHQ over the last two weeks 2/5/2018   Little interest or pleasure in doing things Not at all   Feeling down, depressed, irritable, or hopeless Not at all   Total Score PHQ 2 0     1. Have you been to the ER, urgent care clinic since your last visit? Hospitalized since your last visit? No    2. Have you seen or consulted any other health care providers outside of the [address] [place] [address] since your last visit? Include any pap smears or colon screening.  No

## 2018-08-07 LAB
ALBUMIN SERPL-MCNC: 4.5 G/DL (ref 3.6–4.8)
ALBUMIN/GLOB SERPL: 2 {RATIO} (ref 1.2–2.2)
ALP SERPL-CCNC: 67 IU/L (ref 39–117)
ALT SERPL-CCNC: 12 IU/L (ref 0–32)
AST SERPL-CCNC: 14 IU/L (ref 0–40)
BILIRUB SERPL-MCNC: 0.6 MG/DL (ref 0–1.2)
BUN SERPL-MCNC: 22 MG/DL (ref 8–27)
BUN/CREAT SERPL: 39 (ref 12–28)
CALCIUM SERPL-MCNC: 9.4 MG/DL (ref 8.7–10.3)
CHLORIDE SERPL-SCNC: 104 MMOL/L (ref 96–106)
CO2 SERPL-SCNC: 20 MMOL/L (ref 20–29)
CREAT SERPL-MCNC: 0.57 MG/DL (ref 0.57–1)
ERYTHROCYTE [DISTWIDTH] IN BLOOD BY AUTOMATED COUNT: 13.2 % (ref 12.3–15.4)
GLOBULIN SER CALC-MCNC: 2.2 G/DL (ref 1.5–4.5)
GLUCOSE SERPL-MCNC: 99 MG/DL (ref 65–99)
HCT VFR BLD AUTO: 37.2 % (ref 34–46.6)
HGB BLD-MCNC: 12.6 G/DL (ref 11.1–15.9)
MCH RBC QN AUTO: 32.5 PG (ref 26.6–33)
MCHC RBC AUTO-ENTMCNC: 33.9 G/DL (ref 31.5–35.7)
MCV RBC AUTO: 96 FL (ref 79–97)
PLATELET # BLD AUTO: 201 X10E3/UL (ref 150–379)
POTASSIUM SERPL-SCNC: 4.6 MMOL/L (ref 3.5–5.2)
PROT SERPL-MCNC: 6.7 G/DL (ref 6–8.5)
RBC # BLD AUTO: 3.88 X10E6/UL (ref 3.77–5.28)
SODIUM SERPL-SCNC: 141 MMOL/L (ref 134–144)
WBC # BLD AUTO: 6.1 X10E3/UL (ref 3.4–10.8)

## 2018-08-08 LAB — HCV RNA SERPL QL NAA+PROBE: NEGATIVE

## 2019-04-03 NOTE — TELEPHONE ENCOUNTER
Pt would like a call for results
Returned pts call to discuss lab results. Set pt up with a f/u appt to start HCV tx.
Pertinent PMH/PSH/FHx/SHx and Review of Systems contained within:   50 y/o F s/p right shoulder surgery yesterday in [place] presents to ed c/o pain in shoulder , dull constant severe for past few hours. , no fever. No radiation of pain.

## 2022-03-19 PROBLEM — I10 HYPERTENSION: Status: ACTIVE | Noted: 2017-06-20

## 2022-03-19 PROBLEM — Z90.49 S/P CHOLECYSTECTOMY: Status: ACTIVE | Noted: 2017-06-20

## 2022-03-20 PROBLEM — B18.2 CHRONIC HEPATITIS C WITHOUT HEPATIC COMA (HCC): Status: ACTIVE | Noted: 2017-06-20

## 2025-06-19 RX ORDER — LANOLIN ALCOHOL/MO/W.PET/CERES
1000 CREAM (GRAM) TOPICAL DAILY
COMMUNITY

## 2025-06-19 RX ORDER — MULTIVITAMIN WITH IRON
1 TABLET ORAL DAILY
COMMUNITY

## 2025-06-19 RX ORDER — GLUCOSAMINE/D3/BOSWELLIA SERRA 1500MG-400
1 TABLET ORAL DAILY
COMMUNITY

## 2025-06-19 RX ORDER — PNV NO.95/FERROUS FUM/FOLIC AC 28MG-0.8MG
1 TABLET ORAL DAILY
COMMUNITY

## 2025-06-19 RX ORDER — LISINOPRIL 20 MG/1
20 TABLET ORAL DAILY
COMMUNITY

## 2025-06-19 NOTE — PERIOP NOTE
[facility]  Ambulatory Surgery Unit  Pre-operative Instructions    Surgery/Procedure Date  6/25/2025            Tentative Arrival Time TBD      1. On the day of your surgery/procedure, please report to the Ambulatory Surgery Unit Registration Desk and sign in at your designated time. The Ambulatory Surgery Unit is located in [facility] on the [facility] of the [facility] across from the [facility]. Please have all of your health insurance cards, co-payment, and a photo ID.    **TWO adults may accompany you the day of the procedure.  We have limited seating available.      2. You cannot be dropped off for surgery.  Please make arrangements for a responsible adult friend or family member to remain on the hospital campus during your procedure, and drive you home, as you should not drive for 24 hours following anesthesia. Make arrangements for a responsible adult to stay with you for at least the first 24 hours after your surgery.    3. Do not have anything to eat or drink (including water, gum, mints, coffee, juice) after 11:59 PM  6/24/2025. This may not apply to medications prescribed by your physician.  (Please note below the special instructions with medications to take the morning of surgery, if applicable.)    4. We recommend you do not drink any alcoholic beverages for 24 hours before and after your surgery.    5. Contact your surgeon’s office for instructions on the following medications: non-steroidal anti-inflammatory drugs (i.e. Advil, Aleve), vitamins, and supplements. (Some surgeon’s will want you to stop these medications prior to surgery and others may allow you to take them)   **If you are currently taking Plavix, Coumadin, Aspirin and/or other blood-thinning agents, contact your surgeon for instructions.** Your surgeon will partner with the physician prescribing these medications to determine if it is safe to stop or if you need to continue taking. Please do not

## 2025-06-24 ENCOUNTER — ANESTHESIA EVENT (OUTPATIENT)
Facility: HOSPITAL | Age: 65
End: 2025-06-24
Payer: MEDICARE

## 2025-06-24 PROBLEM — H25.12 NUCLEAR SCLEROTIC CATARACT OF LEFT EYE: Status: ACTIVE | Noted: 2025-06-24

## 2025-06-24 NOTE — DISCHARGE INSTRUCTIONS
>>>You received Tylenol 1000mg prior to your surgery, you may take Tylenol (or pain medication containing Tylenol or Acetaminophen) in 6 hours at 2:00pm.<<<      Meera Lawler D[facility]  [address] [place] [address]  [phone]    Post-Operative Instructions for Cataract Surgery    Remove your eye shield and bandage at 12 noon the same day as surgery and start your eye drops.              THROW AWAY THE GAUZE UNDER THE SHIELD.    Place the blue eye shield back on for one week while asleep, even if napping in the recliner.  Use the tape included in your bag.              DO NOT RUB YOUR EYE EVER!     DO NOT WIPE YOUR EYE! ONLY WIPE ON YOUR CHEEK!                DO NOT WEAR EYE MAKEUP FOR 1 WEEK!      You can shower starting tomorrow.      You may resume your previous diet.    If you use glaucoma drops in the operative eye, continue them as directed.    Common symptoms after surgery include a scratchy feeling, slight headache, red eye, and/or blurred vision.  You may use Advil or Tylenol for any discomfort.    Avoid strenuous activities and driving until you see Dr. Lawler tomorrow.  Please use care when walking, your depth perception may be altered.    Bring your bag with your drops to your follow up appointment.    CONTINUE DROPS UNTIL ALL BOTTLES ARE EMPTY!    Follow-up appointment tomorrow at Dr. Lawler’s office: 08:30 am    Please call the office at [phone] if you experience severe pain or nausea.       PATIENT INSTRUCTIONS:    After General Anesthesia or Intravenous Sedation, for 24 hours or while taking prescription Narcotics:        Someone should be with you for the next 24 hours.        For your own safety, a responsible adult must drive you home.  Limit your activities  Recommended activity: Rest today, up with assistance today. Do not climb stairs or shower unattended for the next 24 hours.  Please start with a soft bland diet and advance as tolerated (no nausea) to regular diet.  If you have a sore throat

## 2025-06-25 ENCOUNTER — ANESTHESIA (OUTPATIENT)
Facility: HOSPITAL | Age: 65
End: 2025-06-25
Payer: MEDICARE

## 2025-06-25 ENCOUNTER — HOSPITAL ENCOUNTER (OUTPATIENT)
Facility: HOSPITAL | Age: 65
Setting detail: OUTPATIENT SURGERY
Discharge: HOME | End: 2025-06-25
Attending: OPHTHALMOLOGY | Admitting: OPHTHALMOLOGY
Payer: MEDICARE

## 2025-06-25 VITALS
TEMPERATURE: 98.6 F | BODY MASS INDEX: 20.58 KG/M2 | HEART RATE: 74 BPM | RESPIRATION RATE: 16 BRPM | DIASTOLIC BLOOD PRESSURE: 76 MMHG | WEIGHT: 109 LBS | OXYGEN SATURATION: 99 % | SYSTOLIC BLOOD PRESSURE: 133 MMHG | HEIGHT: 61 IN

## 2025-06-25 PROBLEM — H25.12 NUCLEAR SCLEROTIC CATARACT OF LEFT EYE: Status: RESOLVED | Noted: 2025-06-24 | Resolved: 2025-06-25

## 2025-06-25 PROCEDURE — 7100000000 HC PACU RECOVERY - FIRST 15 MIN: Performed by: OPHTHALMOLOGY

## 2025-06-25 PROCEDURE — 3600000003 HC SURGERY LEVEL 3 BASE: Performed by: OPHTHALMOLOGY

## 2025-06-25 PROCEDURE — 3700000000 HC ANESTHESIA ATTENDED CARE: Performed by: OPHTHALMOLOGY

## 2025-06-25 PROCEDURE — 2500000003 HC RX 250 WO HCPCS

## 2025-06-25 PROCEDURE — 2580000003 HC RX 258: Performed by: ANESTHESIOLOGY

## 2025-06-25 PROCEDURE — 2709999900 HC NON-CHARGEABLE SUPPLY: Performed by: OPHTHALMOLOGY

## 2025-06-25 PROCEDURE — 6360000002 HC RX W HCPCS: Performed by: OPHTHALMOLOGY

## 2025-06-25 PROCEDURE — 6370000000 HC RX 637 (ALT 250 FOR IP): Performed by: OPHTHALMOLOGY

## 2025-06-25 PROCEDURE — 7100000011 HC PHASE II RECOVERY - ADDTL 15 MIN: Performed by: OPHTHALMOLOGY

## 2025-06-25 PROCEDURE — 6370000000 HC RX 637 (ALT 250 FOR IP)

## 2025-06-25 PROCEDURE — 3700000001 HC ADD 15 MINUTES (ANESTHESIA): Performed by: OPHTHALMOLOGY

## 2025-06-25 PROCEDURE — 6360000002 HC RX W HCPCS

## 2025-06-25 PROCEDURE — 3600000013 HC SURGERY LEVEL 3 ADDTL 15MIN: Performed by: OPHTHALMOLOGY

## 2025-06-25 PROCEDURE — 7100000010 HC PHASE II RECOVERY - FIRST 15 MIN: Performed by: OPHTHALMOLOGY

## 2025-06-25 PROCEDURE — 2500000003 HC RX 250 WO HCPCS: Performed by: OPHTHALMOLOGY

## 2025-06-25 PROCEDURE — V2632 POST CHMBR INTRAOCULAR LENS: HCPCS | Performed by: OPHTHALMOLOGY

## 2025-06-25 DEVICE — STERILE UV AND BLUE LIGHT FILTERING ACRYLIC FOLDABLE ASPHERIC POSTERIOR CHAMBER INTRAOCULAR LENS
Type: IMPLANTABLE DEVICE | Site: EYE | Status: FUNCTIONAL
Brand: CLAREON

## 2025-06-25 RX ORDER — SODIUM CHLORIDE 9 MG/ML
INJECTION, SOLUTION INTRAVENOUS PRN
Status: DISCONTINUED | OUTPATIENT
Start: 2025-06-25 | End: 2025-06-25 | Stop reason: HOSPADM

## 2025-06-25 RX ORDER — ACETAMINOPHEN 500 MG
1000 TABLET ORAL ONCE
Status: COMPLETED | OUTPATIENT
Start: 2025-06-25 | End: 2025-06-25

## 2025-06-25 RX ORDER — KETOROLAC TROMETHAMINE 30 MG/ML
15 INJECTION, SOLUTION INTRAMUSCULAR; INTRAVENOUS
Status: DISCONTINUED | OUTPATIENT
Start: 2025-06-25 | End: 2025-06-25 | Stop reason: HOSPADM

## 2025-06-25 RX ORDER — LIDOCAINE HYDROCHLORIDE 10 MG/ML
1 INJECTION, SOLUTION EPIDURAL; INFILTRATION; INTRACAUDAL; PERINEURAL
Status: DISCONTINUED | OUTPATIENT
Start: 2025-06-25 | End: 2025-06-25 | Stop reason: HOSPADM

## 2025-06-25 RX ORDER — FENTANYL CITRATE 50 UG/ML
INJECTION, SOLUTION INTRAMUSCULAR; INTRAVENOUS
Status: DISCONTINUED | OUTPATIENT
Start: 2025-06-25 | End: 2025-06-25 | Stop reason: SDUPTHER

## 2025-06-25 RX ORDER — SODIUM CHLORIDE 0.9 % (FLUSH) 0.9 %
5-40 SYRINGE (ML) INJECTION PRN
Status: DISCONTINUED | OUTPATIENT
Start: 2025-06-25 | End: 2025-06-25 | Stop reason: HOSPADM

## 2025-06-25 RX ORDER — SODIUM CHLORIDE, POTASSIUM CHLORIDE, CALCIUM CHLORIDE, MAGNESIUM CHLORIDE, SODIUM ACETATE, AND SODIUM CITRATE 6.4; .75; .48; .3; 3.9; 1.7 MG/ML; MG/ML; MG/ML; MG/ML; MG/ML; MG/ML
15 SOLUTION IRRIGATION ONCE
Status: COMPLETED | OUTPATIENT
Start: 2025-06-25 | End: 2025-06-25

## 2025-06-25 RX ORDER — SODIUM CHLORIDE 0.9 % (FLUSH) 0.9 %
5-40 SYRINGE (ML) INJECTION EVERY 12 HOURS SCHEDULED
Status: DISCONTINUED | OUTPATIENT
Start: 2025-06-25 | End: 2025-06-25 | Stop reason: HOSPADM

## 2025-06-25 RX ORDER — ONDANSETRON 2 MG/ML
4 INJECTION INTRAMUSCULAR; INTRAVENOUS
Status: DISCONTINUED | OUTPATIENT
Start: 2025-06-25 | End: 2025-06-25 | Stop reason: HOSPADM

## 2025-06-25 RX ORDER — PROPARACAINE HYDROCHLORIDE 5 MG/ML
1 SOLUTION/ DROPS OPHTHALMIC ONCE
Status: COMPLETED | OUTPATIENT
Start: 2025-06-25 | End: 2025-06-25

## 2025-06-25 RX ORDER — DICLOFENAC SODIUM 1 MG/ML
SOLUTION/ DROPS OPHTHALMIC
Status: COMPLETED
Start: 2025-06-25 | End: 2025-06-25

## 2025-06-25 RX ORDER — DROPERIDOL 2.5 MG/ML
0.62 INJECTION, SOLUTION INTRAMUSCULAR; INTRAVENOUS
Status: DISCONTINUED | OUTPATIENT
Start: 2025-06-25 | End: 2025-06-25 | Stop reason: HOSPADM

## 2025-06-25 RX ORDER — DICLOFENAC SODIUM 1 MG/ML
1 SOLUTION/ DROPS OPHTHALMIC ONCE
Status: COMPLETED | OUTPATIENT
Start: 2025-06-25 | End: 2025-06-25

## 2025-06-25 RX ORDER — ACETAMINOPHEN 500 MG
TABLET ORAL
Status: COMPLETED
Start: 2025-06-25 | End: 2025-06-25

## 2025-06-25 RX ORDER — FENTANYL CITRATE 50 UG/ML
25 INJECTION, SOLUTION INTRAMUSCULAR; INTRAVENOUS EVERY 5 MIN PRN
Status: DISCONTINUED | OUTPATIENT
Start: 2025-06-25 | End: 2025-06-25 | Stop reason: HOSPADM

## 2025-06-25 RX ORDER — ERYTHROMYCIN 5 MG/G
OINTMENT OPHTHALMIC ONCE
Status: COMPLETED | OUTPATIENT
Start: 2025-06-25 | End: 2025-06-25

## 2025-06-25 RX ORDER — PREDNISOLONE ACETATE 10 MG/ML
1 SUSPENSION/ DROPS OPHTHALMIC ONCE
Status: COMPLETED | OUTPATIENT
Start: 2025-06-25 | End: 2025-06-25

## 2025-06-25 RX ORDER — POVIDONE-IODINE 5 %
SOLUTION, NON-ORAL OPHTHALMIC (EYE) ONCE
Status: COMPLETED | OUTPATIENT
Start: 2025-06-25 | End: 2025-06-25

## 2025-06-25 RX ORDER — MIDAZOLAM HYDROCHLORIDE 1 MG/ML
INJECTION, SOLUTION INTRAMUSCULAR; INTRAVENOUS
Status: DISCONTINUED | OUTPATIENT
Start: 2025-06-25 | End: 2025-06-25 | Stop reason: SDUPTHER

## 2025-06-25 RX ORDER — SODIUM CHLORIDE, SODIUM LACTATE, POTASSIUM CHLORIDE, CALCIUM CHLORIDE 600; 310; 30; 20 MG/100ML; MG/100ML; MG/100ML; MG/100ML
INJECTION, SOLUTION INTRAVENOUS CONTINUOUS
Status: DISCONTINUED | OUTPATIENT
Start: 2025-06-25 | End: 2025-06-25 | Stop reason: HOSPADM

## 2025-06-25 RX ORDER — ONDANSETRON 2 MG/ML
INJECTION INTRAMUSCULAR; INTRAVENOUS
Status: DISCONTINUED | OUTPATIENT
Start: 2025-06-25 | End: 2025-06-25 | Stop reason: SDUPTHER

## 2025-06-25 RX ORDER — TROPICAMIDE 10 MG/ML
SOLUTION/ DROPS OPHTHALMIC
Status: COMPLETED
Start: 2025-06-25 | End: 2025-06-25

## 2025-06-25 RX ORDER — PROPARACAINE HYDROCHLORIDE 5 MG/ML
SOLUTION/ DROPS OPHTHALMIC
Status: COMPLETED
Start: 2025-06-25 | End: 2025-06-25

## 2025-06-25 RX ORDER — NALOXONE HYDROCHLORIDE 0.4 MG/ML
INJECTION, SOLUTION INTRAMUSCULAR; INTRAVENOUS; SUBCUTANEOUS PRN
Status: DISCONTINUED | OUTPATIENT
Start: 2025-06-25 | End: 2025-06-25 | Stop reason: HOSPADM

## 2025-06-25 RX ORDER — TROPICAMIDE 10 MG/ML
1 SOLUTION/ DROPS OPHTHALMIC ONCE
Status: COMPLETED | OUTPATIENT
Start: 2025-06-25 | End: 2025-06-25

## 2025-06-25 RX ORDER — LIDOCAINE HYDROCHLORIDE 40 MG/ML
4 INJECTION, SOLUTION RETROBULBAR ONCE
Status: DISCONTINUED | OUTPATIENT
Start: 2025-06-25 | End: 2025-06-25 | Stop reason: HOSPADM

## 2025-06-25 RX ORDER — LIDOCAINE HYDROCHLORIDE 20 MG/ML
INJECTION, SOLUTION EPIDURAL; INFILTRATION; INTRACAUDAL; PERINEURAL PRN
Status: DISCONTINUED | OUTPATIENT
Start: 2025-06-25 | End: 2025-06-25 | Stop reason: ALTCHOICE

## 2025-06-25 RX ORDER — TETRACAINE HYDROCHLORIDE 5 MG/ML
1 SOLUTION OPHTHALMIC ONCE
Status: COMPLETED | OUTPATIENT
Start: 2025-06-25 | End: 2025-06-25

## 2025-06-25 RX ADMIN — TROPICAMIDE 1 DROP: 10 SOLUTION/ DROPS OPHTHALMIC at 08:04

## 2025-06-25 RX ADMIN — ONDANSETRON 4 MG: 2 INJECTION, SOLUTION INTRAMUSCULAR; INTRAVENOUS at 09:18

## 2025-06-25 RX ADMIN — Medication 1000 MG: at 08:03

## 2025-06-25 RX ADMIN — MIDAZOLAM HYDROCHLORIDE 1 MG: 1 INJECTION, SOLUTION INTRAMUSCULAR; INTRAVENOUS at 09:01

## 2025-06-25 RX ADMIN — MIDAZOLAM HYDROCHLORIDE 0.5 MG: 1 INJECTION, SOLUTION INTRAMUSCULAR; INTRAVENOUS at 09:15

## 2025-06-25 RX ADMIN — DICLOFENAC SODIUM 1 DROP: 1 SOLUTION/ DROPS OPHTHALMIC at 08:04

## 2025-06-25 RX ADMIN — SODIUM CHLORIDE: 0.9 INJECTION, SOLUTION INTRAVENOUS at 08:09

## 2025-06-25 RX ADMIN — PROPARACAINE HYDROCHLORIDE 1 DROP: 5 SOLUTION/ DROPS OPHTHALMIC at 08:04

## 2025-06-25 RX ADMIN — ACETAMINOPHEN 1000 MG: 500 TABLET ORAL at 08:03

## 2025-06-25 RX ADMIN — FENTANYL CITRATE 25 MCG: 50 INJECTION, SOLUTION INTRAMUSCULAR; INTRAVENOUS at 09:18

## 2025-06-25 ASSESSMENT — PAIN DESCRIPTION - LOCATION: LOCATION: EYE

## 2025-06-25 ASSESSMENT — PAIN SCALES - GENERAL
PAINLEVEL_OUTOF10: 3
PAINLEVEL_OUTOF10: 4
PAINLEVEL_OUTOF10: 3
PAINLEVEL_OUTOF10: 3

## 2025-06-25 ASSESSMENT — PAIN DESCRIPTION - DESCRIPTORS: DESCRIPTORS: BURNING

## 2025-06-25 ASSESSMENT — PAIN DESCRIPTION - ORIENTATION: ORIENTATION: LEFT

## 2025-06-25 ASSESSMENT — PAIN - FUNCTIONAL ASSESSMENT: PAIN_FUNCTIONAL_ASSESSMENT: 0-10

## 2025-06-25 NOTE — PERIOP NOTE
D/c instructions reviewed with pt's daughter, all questions answered.  Reviewed when to call the doctor,diet,activity and hygiene.  Reviewed when/what to take for pain, use of eye drops and be careful walking around.

## 2025-06-25 NOTE — PERIOP NOTE
Zahida Errol  1960  [id number]    Situation:  Verbal report given from: JAG Cruz CRNA, JESS Mohr RN  Procedure: Procedure(s):  LEFT EYE ONLY REFRACTIVE CATARACT REMOVAL WITH LENS IMPANTATION    Background:    Preoperative diagnosis: Nuclear sclerotic cataract of left eye [H25.12]    Postoperative diagnosis: * No post-op diagnosis entered *    :  Dr. Lawler    Assistant(s): Circulator: Josey Mohr RN  Scrub Person First: Ramirez Mejia  Circulator Assist: Kusum Trejo RN    Specimens: * No specimens in log *    Assessment:  Intra-procedure medications         Anesthesia gave intra-procedure sedation and medications, see anesthesia flow sheet     Intravenous fluids: LR@ KVO     Vital signs stable       Recommendation:

## 2025-06-25 NOTE — PROGRESS NOTES
Permission received to review discharge instructions and private health information with daughterInna and will have family/friends home with them for 24 hours.     Mistral air blanket applied and set to patient's desired comfort     Belongings placed in PACU glasses.

## 2025-06-25 NOTE — ANESTHESIA PRE PROCEDURE
Department of Anesthesiology  Preprocedure Note       Name:  Zahida Norton   Age:  65 y.o.  :  1960                                          MRN:  [id number]         Date:  2025      Surgeon: Surgeon(s):  Meera Lawler DO    Procedure: Procedure(s):  LEFT EYE ONLY REFRACTIVE CATARACT REMOVAL WITH LENS IMPANTATION    Medications prior to admission:   Prior to Admission medications    Medication Sig Start Date End Date Taking? Authorizing Provider   lisinopril (PRINIVIL;ZESTRIL) 20 MG tablet Take 1 tablet by mouth daily   Yes Krishna Diallo MD   Omega-3 Fatty Acids (FISH OIL CONCENTRATE) 300 MG CAPS Take 1 capsule by mouth daily   Yes Krishna Diallo MD   Ferrous Sulfate (IRON) 325 (65 Fe) MG TABS Take 1 tablet by mouth daily   Yes Krishna Diallo MD   Cholecalciferol 50 MCG (2000 UT) TBDP Take 1 capsule by mouth daily   Yes Krishna Diallo MD   Multiple Vitamins-Minerals (PRESERVISION/LUTEIN PO) Take 1 tablet by mouth daily   Yes Krishna Diallo MD   vitamin B-12 (CYANOCOBALAMIN) 1000 MCG tablet Take 1 tablet by mouth daily   Yes Krishna Diallo MD   Multiple Vitamins-Minerals (CENTRUM SILVER PO) Take 1 tablet by mouth daily   Yes Krishna Diallo MD   Biotin 59976 MCG TABS Take 1 tablet by mouth daily   Yes Krishna Diallo MD   TURMERIC PO Take 1 capsule by mouth daily   Yes Krishna Diallo MD       Current medications:    Current Facility-Administered Medications   Medication Dose Route Frequency Provider Last Rate Last Admin   • proparacaine (ALCAINE) 0.5 % ophthalmic solution 1 drop  1 drop Left Eye Once Meera Lawler DO        And   • tropicamide (MYDRIACYL) 1 % ophthalmic solution 1 drop  1 drop Left Eye Once Meera Lawler DO        And   • diclofenac (VOLTAREN) 0.1 % ophthalmic solution 1 drop  1 drop Left Eye Once Meera Lawler DO         Current Outpatient Medications   Medication Sig Dispense Refill   • lisinopril

## 2025-06-25 NOTE — ANESTHESIA POSTPROCEDURE EVALUATION
Department of Anesthesiology  Postprocedure Note    Patient: Zahida Norton  MRN: [id number]  YOB: 1960  Date of evaluation: 6/25/2025    Procedure Summary       Date: 06/25/25 Room / Location: [facility] ASU  / [facility] AMBULATORY OR    Anesthesia Start: 0857 Anesthesia Stop: 0939    Procedure: LEFT EYE ONLY REFRACTIVE CATARACT REMOVAL WITH LENS IMPANTATION (Left: Eye) Diagnosis:       Nuclear sclerotic cataract of left eye      (Nuclear sclerotic cataract of left eye [H25.12])    Surgeons: Meera Lawler DO Responsible Provider: Tamara Landaverde MD    Anesthesia Type: MAC ASA Status: 2            Anesthesia Type: MAC    Lou Phase I: Lou Score: 10    Lou Phase II: Lou Score: 10    Anesthesia Post Evaluation    Patient location during evaluation: PACU  Patient participation: complete - patient participated  Level of consciousness: awake and alert  Pain score: 3  Airway patency: patent  Nausea & Vomiting: no vomiting and no nausea  Cardiovascular status: blood pressure returned to baseline and hemodynamically stable  Respiratory status: acceptable  Hydration status: stable  Multimodal analgesia pain management approach  Pain management: satisfactory to patient    No notable events documented.   

## 2025-06-25 NOTE — OP NOTE
carried out. Any remaining cortical material was removed in irrigation/aspiration mode. Viscoelastic was then instilled into the capsular bag. The lens implant was inspected for any defects. After finding none, the lens was placed in its injector and inserted into the capsular bag. The lens implant was centered on the patient's visual/astigmatic axis. The viscoelastic was then removed from the eye. Miochol was instilled posterior to the iris plane to facilitate pupillary miosis. The wound was checked for any leaks, after finding none, Iopidine and Pred Forte drops were instilled into the inferior cul-de-sac, and erythromycin ointment was placed over the cornea. A semi-pressure dressing and shield were then placed for the patient.The patient tolerated the procedure very well, and was brought to the recovery room in an alert and stable and satisfactory postoperative condition. Instructions were given to the patient and their family for their immediate postoperative care.  Meera Lawler DO  6/25/2025

## (undated) DEVICE — Device

## (undated) DEVICE — GLOVE ORANGE PI 7   MSG9070

## (undated) DEVICE — SYRINGE MEDICAL 3ML CLEAR PLASTIC STANDARD NON CONTROL LUERLOCK TIP DISPOSABLE

## (undated) DEVICE — THE MONARCH® "D" CARTRIDGE IS A SINGLE-USE POLYPROPYLENE CARTRIDGE FOR POSTERIOR CHAMBER IOL DELIVERY: Brand: MONARCH® III

## (undated) DEVICE — SURGICAL PROCEDURE PACK CATRCT CUST

## (undated) DEVICE — SYRINGE MED 30ML STD CLR PLAS LUERLOCK TIP N CTRL DISP

## (undated) DEVICE — SOLUTION IRRG STRL H2O 500 ML BTL 16/CA